# Patient Record
Sex: FEMALE | Race: WHITE | NOT HISPANIC OR LATINO | URBAN - METROPOLITAN AREA
[De-identification: names, ages, dates, MRNs, and addresses within clinical notes are randomized per-mention and may not be internally consistent; named-entity substitution may affect disease eponyms.]

---

## 2019-03-05 NOTE — PATIENT DISCUSSION
I recommend DSAEK OS. The risks to surgery were discussed, including graft dislocation requiring a second operation to reposition the corneal button, infection, graft rejection, and glaucoma,  The patient elects to proceed with surgery. We discussed the need to lay flat for 24 hours after surgery in order to optimize corneal graft position. PBB pt e, u, & a.

## 2022-03-09 ENCOUNTER — NEW PATIENT (OUTPATIENT)
Dept: URBAN - METROPOLITAN AREA CLINIC 4 | Facility: CLINIC | Age: 47
End: 2022-03-09

## 2022-03-09 DIAGNOSIS — H11.153: ICD-10-CM

## 2022-03-09 PROCEDURE — 92015 DETERMINE REFRACTIVE STATE: CPT

## 2022-03-09 PROCEDURE — 92004 COMPRE OPH EXAM NEW PT 1/>: CPT

## 2022-03-09 PROCEDURE — 92310A CONTACT LENS 50

## 2022-03-09 ASSESSMENT — KERATOMETRY
OS_AXISANGLE_DEGREES: 180
OD_K1POWER_DIOPTERS: 44.75
OD_K2POWER_DIOPTERS: 45
OS_K1POWER_DIOPTERS: 44.50
OS_K2POWER_DIOPTERS: 44.50
OD_AXISANGLE_DEGREES: 161
OD_AXISANGLE2_DEGREES: 71
OS_AXISANGLE2_DEGREES: 90

## 2022-03-09 ASSESSMENT — TONOMETRY
OS_IOP_MMHG: 14
OD_IOP_MMHG: 14

## 2022-03-09 ASSESSMENT — VISUAL ACUITY
OU_SC: 20/20
OD_SC: 20/20-1
OS_GLARE: 20/40-1
OS_SC: 20/20
OD_GLARE: 20/40-1

## 2022-07-03 RX ORDER — DEXTROAMPHETAMINE SACCHARATE, AMPHETAMINE ASPARTATE, DEXTROAMPHETAMINE SULFATE AND AMPHETAMINE SULFATE 2.5; 2.5; 2.5; 2.5 MG/1; MG/1; MG/1; MG/1
TABLET ORAL
COMMUNITY

## 2022-07-03 RX ORDER — ESCITALOPRAM OXALATE 20 MG/1
TABLET ORAL
COMMUNITY

## 2022-07-03 RX ORDER — LEVOTHYROXINE SODIUM 112 UG/1
TABLET ORAL
COMMUNITY

## 2023-01-12 NOTE — PATIENT DISCUSSION
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 Recommend either the DMEK or the DSAEK. Discussed the R/B/A of both surgeries. patient wishes to hold off for now. If patient proceeds will have to have the LPI done prior to surgery.